# Patient Record
(demographics unavailable — no encounter records)

---

## 2024-10-16 NOTE — ASSESSMENT
[FreeTextEntry1] : Risks benefits and alternatives of sinus surgery were discussed in detail.  Pt will try breathe rite strips in the interim

## 2024-10-16 NOTE — HISTORY OF PRESENT ILLNESS
[FreeTextEntry1] : Patient presents today with c/o sinus issues. Symptoms started Saturday. Symptoms included eye pressure, facial pressure, jaw pain. Had PND, neck pain and sore throat. Patient completed antibiotics that had at home, Zpack. Reports having green mucus for 2 days which is cleared. Currently having improvement of symptoms but has PND.

## 2024-10-16 NOTE — PHYSICAL EXAM
[Normal] : mucosa is normal [Midline] : trachea located in midline position [] : septum deviated to the left [de-identified] : nasal valve collapse, improvement with Blanco maneuver and lateralization [de-identified] : edema  [de-identified] : thick

## 2024-10-16 NOTE — PROCEDURE
[None] : none [Flexible Endoscope] : examined with the flexible endoscope [Congested] : congested [Deviated to the Lt] : deviated to the left [FreeTextEntry6] : The following anatomic sites were directly examined in a sequential fashion: The scope was introduced in the nasal passage between the middle and inferior turbinates to exam the inferior portion of the middle meatus and the fontanelle, as well as the maxillary ostia. Next, the scope was passed medically and posteriorly to the middle turbinates to examine the sphenoethmoid recess and the superior turbinate region. turbinate hypertrophy

## 2025-01-10 NOTE — HISTORY OF PRESENT ILLNESS
[FreeTextEntry1] : Fariha is here for the F/U accompanied with her  She was admitted to the EMU in October for risk assessment to come off the AED the two days study was normal She was discharged on a smaller dose of Trileptal 150 bid She says she is doing very well < I am doing better> She is sleeping well Her blood pressure today was 170 and as it appears it is slightly volatile she did have a blood test . The level back yet she also did a R-EEG today in the office

## 2025-01-10 NOTE — ASSESSMENT
[FreeTextEntry1] : 1- Sz D/O doing well No Seizures X > 2 years 2- HTN 3- HA ( doing well) 4 - mild cervical pain  plan check the levels  possible tapering off the AED

## 2025-01-10 NOTE — PHYSICAL EXAM
[FreeTextEntry1] : A/A/Ox3 good mood good attention follows 4 step commands CN- normal No drift no dysmetria stable gait negative Romberg.

## 2025-06-23 NOTE — HISTORY OF PRESENT ILLNESS
[FreeTextEntry1] : Fariha Tolentino is a 61 year old female with PMH Partial Epilepsy and Headache, present in office today for follow up.    Fariha denies seizures and episodes suggestive of seizures. She is currently prescribed Trileptal 150mg QHS. She is taking medication as prescribed. Denies missed doses.   REEG performed in office today (6/17/25): Normal   Fariha denies mood issues, headaches, dizziness, balance issues, fall.  She currently denies neck or spinal pains. Fariha reports regularly following up her Chiropractor and engages in daily neck exercises/stretches.   Fariha denies sleep issues, stating she sleeps through the night and is not tired during the day.   She recently saw her PMD and had yearly blood work. Hgb A1C (3/8/25) is 5.7. She reports she has been following healthy diet since then.    NeuroImaging  REEG (1/9/25): Normal

## 2025-06-23 NOTE — PHYSICAL EXAM
[General Appearance - Alert] : alert [General Appearance - In No Acute Distress] : in no acute distress [Oriented To Time, Place, And Person] : oriented to person, place, and time [Affect] : the affect was normal [Person] : oriented to person [Place] : oriented to place [Time] : oriented to time [Short Term Intact] : short term memory intact [Span Intact] : the attention span was normal [Concentration Intact] : normal concentrating ability [Naming Objects] : no difficulty naming common objects [Repeating Phrases] : no difficulty repeating a phrase [Fluency] : fluency intact [Comprehension] : comprehension intact [Cranial Nerves Optic (II)] : visual acuity intact bilaterally,  visual fields full to confrontation, pupils equal round and reactive to light [Cranial Nerves Oculomotor (III)] : extraocular motion intact [Cranial Nerves Trigeminal (V)] : facial sensation intact symmetrically [Cranial Nerves Facial (VII)] : face symmetrical [Cranial Nerves Vestibulocochlear (VIII)] : hearing was intact bilaterally [Cranial Nerves Accessory (XI - Cranial And Spinal)] : head turning and shoulder shrug symmetric [Cranial Nerves Hypoglossal (XII)] : there was no tongue deviation with protrusion [Motor Strength] : muscle strength was normal in all four extremities [Involuntary Movements] : no involuntary movements were seen [Abnormal Walk] : normal gait [Balance] : balance was intact [2+] : Ankle jerk left 2+ [Paresis Pronator Drift Right-Sided] : no pronator drift on the right [Paresis Pronator Drift Left-Sided] : no pronator drift on the left [Motor Strength Upper Extremities Bilaterally] : strength was normal in both upper extremities [Motor Strength Lower Extremities Bilaterally] : strength was normal in both lower extremities [Romberg's Sign] : Romberg's sign was negtive [Past-pointing] : there was no past-pointing [Tremor] : no tremor present [Coordination - Dysmetria Impaired Finger-to-Nose Bilateral] : not present [FreeTextEntry6] : limited neck ROM

## 2025-06-23 NOTE — DISCUSSION/SUMMARY
[Risks Associated with Driving/[place] Law] : As per my usual protocol, the patient was advised in regards to risks and driving privileges associated with the [place] Guidelines.  [Safety Recommendations] : The patient was advised in regards to the risk of seizures and general seizure safety recommendations including not to be bathing alone, climbing to high places and operating heavy machinery. [Compliance with Medications] : The importance of compliance with medications was reinforced. [Medication Side Effects] : High frequency and serious potential medication adverse effects were reviewed with the patient, including but not exclusive to psychiatric effects.  Information sheets on medication side effects were made available to the patient in our clinic.  The patient or advocate agrees to notify us for any concerns. [Bone Health Education] : Patient was educated in regards to bone health and an increased risk of osteoporosis in patients with epilepsy. [Sleep Hygiene/Sleep Disruption Risks] : Sleep hygiene and the risks of sleep disruption were discussed. [FreeTextEntry1] : Fariha Tolentino is a 61 year old female with PMH Partial Epilepsy and Headache, present in office today for follow up. No reported seizures.    Plan:   -Continue Trileptal 150mg QHS  -Patient is going on vacations until August. Will defer medication adjustments until after her return. Plan: Take Trileptal 75mg for 2 weeks, then call office.   -Discussed importance of healthy diet, regular activity/exercise, neck stretching, and posture.   -Instructed to call office if any issues/events before next visit  -Follow up   Case Discussed with Dr. Monty Kapadia, NP